# Patient Record
(demographics unavailable — no encounter records)

---

## 2024-10-14 NOTE — HISTORY OF PRESENT ILLNESS
[Irregular Periods] : irregular periods [Headaches] : no headaches [Polyuria] : no polyuria [Polydipsia] : no polydipsia [Constipation] : no constipation [Fatigue] : no fatigue [Abdominal Pain] : no abdominal pain [Vomiting] : no vomiting [FreeTextEntry2] : Nataliya is a now 13 year 10 month old female presenting for a follow-up visit of Hashimoto's thyroiditis.  I saw her 1/27/2021 due to abnormal TFT on 1/27/2021. She was referred for abnormal TFT obtained during regular physical. Her TSH returned at 40.1 uIU/mL low free T4 of 0.69, positive anti TPO antibody and positive Thyroglobulin Ab, normal CBC and normal CMP. U/S showed mildly heterogeneous thyroid gland with diffuse hyperemia. She also has a goiter all this is consistent with Hashimoto's thyroiditis. We reviewed that Hashimoto's can have waxing and waning course so first recommended repeat results in another couple of weeks. Repeat results 2/9/2021 showed 33.7 uIU/mL thus we recommended she is started on thyroid hormone replacement. Repeat results 4/9/021 showed normal TSH and T4. She has had regular follow-up, had reviewed once growth is almost over may trial off treatment.  I last saw her April 2024 for follow-up. They reported she is well. Got into [employer]. She admits that she is forgetting vitamin D a lot. She has however been consistent with thyroid medication. Repeat results before the visit normal recommended to continue the same dosage of levothyroxine and be more consistent with vitamin D.   Today states she is well, no concerns. Has school off today, school is going well. Thyroid medication is taken consistently. Mother voiced the MVI and vitamin D are on the counter top she just does not remember to take them.  [FreeTextEntry1] : About end of Nov 2023 had menarche, not quite regular, this month did not come yet.

## 2024-10-14 NOTE — PHYSICAL EXAM
[Healthy Appearing] : healthy appearing [Well Nourished] : well nourished [Interactive] : interactive [Normal Appearance] : normal appearance [Well formed] : well formed [Normally Set] : normally set [Normal S1 and S2] : normal S1 and S2 [Clear to Ausculation Bilaterally] : clear to auscultation bilaterally [Abdomen Soft] : soft [Abdomen Tenderness] : non-tender [] : no hepatosplenomegaly [Miguel Angel Stage ___] : the Miguel Angel stage for breast development was [unfilled] [Normal] : normal  [Murmur] : no murmurs

## 2024-10-14 NOTE — CONSULT LETTER
[Dear  ___] : Dear  [unfilled], [Courtesy Letter:] : I had the pleasure of seeing your patient, [unfilled], in my office today. [Please see my note below.] : Please see my note below. [Consult Closing:] : Thank you very much for allowing me to participate in the care of this patient.  If you have any questions, please do not hesitate to contact me. [Sincerely,] : Sincerely, [FreeTextEntry3] : YeouChing Hsu, MD \par  Division of Pediatric Endocrinology \par  [facility] \par   of Pediatrics \par  [facility] [employer] [facility] [employer] [facility]\par

## 2025-04-25 NOTE — HISTORY OF PRESENT ILLNESS
[Mother] : mother [Needs Immunizations] : Needs immunizations [Irregular menses] : irregular menses [Eats meals with family] : eats meals with family [Has family members/adults to turn to for help] : has family members/adults to turn to for help [Is permitted and is able to make independent decisions] : Is permitted and is able to make independent decisions [Normal Performance] : normal performance [Normal Behavior/Attention] : normal behavior/attention [Normal Homework] : normal homework [Eats regular meals including adequate fruits and vegetables] : eats regular meals including adequate fruits and vegetables [Drinks non-sweetened liquids] : drinks non-sweetened liquids  [Calcium source] : calcium source [Has friends] : has friends [Uses safety belts/safety equipment] : uses safety belts/safety equipment  [No] : Patient has not had sexual intercourse [HIV Screening Declined] : HIV Screening Declined [Has ways to cope with stress] : has ways to cope with stress [Displays self-confidence] : displays self-confidence [Has problems with sleep] : has problems with sleep [Gets depressed, anxious, or irritable/has mood swings] : gets depressed, anxious, or irritable/has mood swings [LMP: _____] : LMP: [unfilled] [Days of Bleeding: _____] : Days of bleeding: [unfilled] [Age of Menarche: ____] : Age of Menarche: [unfilled] [Grade: ____] : Grade: [unfilled] [Heavy Bleeding] : no heavy bleeding [Painful Cramps] : no painful cramps [Sleep Concerns] : no sleep concerns [Has concerns about body or appearance] : does not have concerns about body or appearance [Has interests/participates in community activities/volunteers] : does not have interests/participates in community activities/volunteers [Uses electronic nicotine delivery system] : does not use electronic nicotine delivery system [Exposure to electronic nicotine delivery system] : no exposure to electronic nicotine delivery system [Uses tobacco] : does not use tobacco [Exposure to tobacco] : no exposure to tobacco [Uses drugs] : does not use drugs  [Exposure to drugs] : no exposure to drugs [Drinks alcohol] : does not drink alcohol [Exposure to alcohol] : no exposure to alcohol [Has thought about hurting self or considered suicide] : has not thought about hurting self or considered suicide [FreeTextEntry7] : 13 y/o F here for initial APE.  [de-identified] : + chronic constipation; + flat feet [de-identified] : Flu vaccine [FreeTextEntry8] : pt hasn't logged her period. [de-identified] : Pt denies any current SI/HI thoughts [de-identified] : Advised joining clubs - badminton clubs [FreeTextEntry1] : Pt has chronic constipation, having hard stool once every 3 days.  Pt received Flu vaccine at [facility] in winter time.

## 2025-04-25 NOTE — PHYSICAL EXAM

## 2025-04-25 NOTE — HISTORY OF PRESENT ILLNESS
[Mother] : mother [Needs Immunizations] : Needs immunizations [Irregular menses] : irregular menses [Eats meals with family] : eats meals with family [Has family members/adults to turn to for help] : has family members/adults to turn to for help [Is permitted and is able to make independent decisions] : Is permitted and is able to make independent decisions [Normal Performance] : normal performance [Normal Behavior/Attention] : normal behavior/attention [Normal Homework] : normal homework [Eats regular meals including adequate fruits and vegetables] : eats regular meals including adequate fruits and vegetables [Drinks non-sweetened liquids] : drinks non-sweetened liquids  [Calcium source] : calcium source [Has friends] : has friends [Uses safety belts/safety equipment] : uses safety belts/safety equipment  [No] : Patient has not had sexual intercourse [HIV Screening Declined] : HIV Screening Declined [Has ways to cope with stress] : has ways to cope with stress [Displays self-confidence] : displays self-confidence [Has problems with sleep] : has problems with sleep [Gets depressed, anxious, or irritable/has mood swings] : gets depressed, anxious, or irritable/has mood swings [LMP: _____] : LMP: [unfilled] [Days of Bleeding: _____] : Days of bleeding: [unfilled] [Age of Menarche: ____] : Age of Menarche: [unfilled] [Grade: ____] : Grade: [unfilled] [Heavy Bleeding] : no heavy bleeding [Painful Cramps] : no painful cramps [Sleep Concerns] : no sleep concerns [Has concerns about body or appearance] : does not have concerns about body or appearance [Has interests/participates in community activities/volunteers] : does not have interests/participates in community activities/volunteers [Uses electronic nicotine delivery system] : does not use electronic nicotine delivery system [Exposure to electronic nicotine delivery system] : no exposure to electronic nicotine delivery system [Uses tobacco] : does not use tobacco [Exposure to tobacco] : no exposure to tobacco [Uses drugs] : does not use drugs  [Exposure to drugs] : no exposure to drugs [Drinks alcohol] : does not drink alcohol [Exposure to alcohol] : no exposure to alcohol [Has thought about hurting self or considered suicide] : has not thought about hurting self or considered suicide [FreeTextEntry7] : 13 y/o F here for initial APE.  [de-identified] : + chronic constipation; + flat feet [de-identified] : Flu vaccine [FreeTextEntry8] : pt hasn't logged her period. [de-identified] : Advised joining clubs - badminton clubs [de-identified] : Pt denies any current SI/HI thoughts [FreeTextEntry1] : Pt has chronic constipation, having hard stool once every 3 days.  Pt received Flu vaccine at [facility] in winter time.

## 2025-04-25 NOTE — DISCUSSION/SUMMARY
[FreeTextEntry1] : 14 year old female  Continue balanced diet with all food groups. Multivitamins advised. Brush teeth twice a day with toothbrush. Recommend visit to dentist. Maintain consistent daily routines and sleep schedule. Personal hygiene, puberty, and sexual health reviewed. Risky behaviors assessed. School discussed. Limit screen time to no more than 2 hours per day. Encourage physical activity. Return 1 year for routine well child check. Will obtain labs f/u Endo GI referral Podiatry referral f/u [facility]

## 2025-04-25 NOTE — HISTORY OF PRESENT ILLNESS
[Mother] : mother [Needs Immunizations] : Needs immunizations [Irregular menses] : irregular menses [Eats meals with family] : eats meals with family [Has family members/adults to turn to for help] : has family members/adults to turn to for help [Is permitted and is able to make independent decisions] : Is permitted and is able to make independent decisions [Normal Performance] : normal performance [Normal Behavior/Attention] : normal behavior/attention [Normal Homework] : normal homework [Eats regular meals including adequate fruits and vegetables] : eats regular meals including adequate fruits and vegetables [Drinks non-sweetened liquids] : drinks non-sweetened liquids  [Calcium source] : calcium source [Has friends] : has friends [Uses safety belts/safety equipment] : uses safety belts/safety equipment  [No] : Patient has not had sexual intercourse [HIV Screening Declined] : HIV Screening Declined [Has ways to cope with stress] : has ways to cope with stress [Displays self-confidence] : displays self-confidence [Has problems with sleep] : has problems with sleep [Gets depressed, anxious, or irritable/has mood swings] : gets depressed, anxious, or irritable/has mood swings [LMP: _____] : LMP: [unfilled] [Days of Bleeding: _____] : Days of bleeding: [unfilled] [Age of Menarche: ____] : Age of Menarche: [unfilled] [Grade: ____] : Grade: [unfilled] [Heavy Bleeding] : no heavy bleeding [Painful Cramps] : no painful cramps [Sleep Concerns] : no sleep concerns [Has concerns about body or appearance] : does not have concerns about body or appearance [Has interests/participates in community activities/volunteers] : does not have interests/participates in community activities/volunteers [Uses electronic nicotine delivery system] : does not use electronic nicotine delivery system [Exposure to electronic nicotine delivery system] : no exposure to electronic nicotine delivery system [Uses tobacco] : does not use tobacco [Exposure to tobacco] : no exposure to tobacco [Uses drugs] : does not use drugs  [Exposure to drugs] : no exposure to drugs [Drinks alcohol] : does not drink alcohol [Exposure to alcohol] : no exposure to alcohol [Has thought about hurting self or considered suicide] : has not thought about hurting self or considered suicide [FreeTextEntry7] : 15 y/o F here for initial APE.  [de-identified] : + chronic constipation; + flat feet [de-identified] : Flu vaccine [FreeTextEntry8] : pt hasn't logged her period. [de-identified] : Advised joining clubs - badminton clubs [de-identified] : Pt denies any current SI/HI thoughts [FreeTextEntry1] : Pt has chronic constipation, having hard stool once every 3 days.  Pt received Flu vaccine at [facility] in winter time.

## 2025-04-25 NOTE — CARE PLAN
[FreeTextEntry2] : Care plan for Hashimoto's thyroiditis - symptoms free [FreeTextEntry3] : - continue taking Levothyroxine 50mcg daily - f/u Endo - Will obtain labs

## 2025-04-25 NOTE — PHYSICAL EXAM

## 2025-04-25 NOTE — PHYSICAL EXAM

## 2025-05-19 NOTE — CONSULT LETTER
[Dear  ___] : Dear  [unfilled], [Courtesy Letter:] : I had the pleasure of seeing your patient, [unfilled], in my office today. [Please see my note below.] : Please see my note below. [Consult Closing:] : Thank you very much for allowing me to participate in the care of this patient.  If you have any questions, please do not hesitate to contact me. [Sincerely,] : Sincerely, [FreeTextEntry3] : YeouChing Hsu, MD \par  Division of Pediatric Endocrinology \par  [facility] \par   of Pediatrics \par  [facility] [employer] at [facility]\par

## 2025-05-19 NOTE — CONSULT LETTER
[Dear  ___] : Dear  [unfilled], [Courtesy Letter:] : I had the pleasure of seeing your patient, [unfilled], in my office today. [Please see my note below.] : Please see my note below. [Consult Closing:] : Thank you very much for allowing me to participate in the care of this patient.  If you have any questions, please do not hesitate to contact me. [Sincerely,] : Sincerely, [FreeTextEntry3] : YeouChing Hsu, MD \par  Division of Pediatric Endocrinology \par  [facility] \par   of Pediatrics \par  [facility] [employer] at [facility]\par   Is This A New Presentation, Or A Follow-Up?: Moles How Severe Is Your Skin Lesion?: moderate Has Your Skin Lesion Been Treated?: not been treated Which Family Member (Optional)?: Parents and siblings

## 2025-05-19 NOTE — HISTORY OF PRESENT ILLNESS
[Irregular Periods] : irregular periods [Constipation] : constipation [Headaches] : no headaches [Polyuria] : no polyuria [Polydipsia] : no polydipsia [Fatigue] : no fatigue [Abdominal Pain] : no abdominal pain [Vomiting] : no vomiting [FreeTextEntry2] : Nataliya is a now 86-lgkm-7-month-old female presenting for a follow-up visit of Hashimoto's thyroiditis.  I saw her 1/27/2021 due to abnormal TFT on 1/27/2021. She was referred for abnormal TFT obtained during regular physical. Her TSH returned at 40.1 uIU/mL low free T4 of 0.69, positive anti TPO antibody and positive Thyroglobulin Ab, normal CBC and normal CMP. U/S showed mildly heterogeneous thyroid gland with diffuse hyperemia. She also has a goiter all this is consistent with Hashimoto's thyroiditis. Repeat results 2/9/21 still showed elevated TSH to 33.7 uIU/mL thus we recommended she is started on thyroid hormone replacement. Repeat results 4/9/021 showed normal TSH and T4. She has had regular follow-up, had reviewed once growth is almost over may trial off treatment. She has had low vitamin D level but not consistent with vitamin D supplementation. I last saw her 10/14/2025 for follow-up when she was well. Repeat TFT normal, and vitamin D lower on the insufficient range recommended to be consistently taking 2,000 Iu daily of vitamin D along with consistently taking levothyroxine.   Today, they voiced that she is well. She had to reschedule because train delay. She is at [employer] school work is just busy and stressful. Looking into either internship or .  Consistently taking levothyroxine and vitamin D. She does still have on going issues with constipation. They treat with Miralax. She takes it every now and then and it helps it. Every 2-3 days she has bowel movement.  She did see pediatrician April 23rd. They had tried ex-lax and makes her want to go in school so not feeling great so does not want to take it.  She had blood work done with PCP, and Nataliya admits it was after that she was consistently taking hre vitamin D.  [FreeTextEntry1] : About end of Nov 2023 had menarche, not quite regular, this month did not come yet.

## 2025-05-19 NOTE — HISTORY OF PRESENT ILLNESS
[Irregular Periods] : irregular periods [Constipation] : constipation [Headaches] : no headaches [Polyuria] : no polyuria [Polydipsia] : no polydipsia [Fatigue] : no fatigue [Abdominal Pain] : no abdominal pain [Vomiting] : no vomiting [FreeTextEntry2] : Nataliya is a now 62-[id number]-9-month-old female presenting for a follow-up visit of Hashimoto's thyroiditis.  I saw her 1/27/2021 due to abnormal TFT on 1/27/2021. She was referred for abnormal TFT obtained during regular physical. Her TSH returned at 40.1 uIU/mL low free T4 of 0.69, positive anti TPO antibody and positive Thyroglobulin Ab, normal CBC and normal CMP. U/S showed mildly heterogeneous thyroid gland with diffuse hyperemia. She also has a goiter all this is consistent with Hashimoto's thyroiditis. Repeat results 2/9/21 still showed elevated TSH to 33.7 uIU/mL thus we recommended she is started on thyroid hormone replacement. Repeat results 4/9/021 showed normal TSH and T4. She has had regular follow-up, had reviewed once growth is almost over may trial off treatment. She has had low vitamin D level but not consistent with vitamin D supplementation. I last saw her 10/14/2025 for follow-up when she was well. Repeat TFT normal, and vitamin D lower on the insufficient range recommended to be consistently taking 2,000 Iu daily of vitamin D along with consistently taking levothyroxine.   Today, they voiced that she is well. She had to reschedule because train delay. She is at [employer] school work is just busy and stressful. Looking into either internship or .  Consistently taking levothyroxine and vitamin D. She does still have on going issues with constipation. They treat with Miralax. She takes it every now and then and it helps it. Every 2-3 days she has bowel movement.  She did see pediatrician April 23rd. They had tried ex-lax and makes her want to go in school so not feeling great so does not want to take it.  She had blood work done with PCP, and Nataliya admits it was after that she was consistently taking hre vitamin D.  [FreeTextEntry1] : About end of Nov 2023 had menarche, not quite regular, this month did not come yet.

## 2025-05-19 NOTE — CONSULT LETTER
[Dear  ___] : Dear  [unfilled], [Courtesy Letter:] : I had the pleasure of seeing your patient, [unfilled], in my office today. [Please see my note below.] : Please see my note below. [Consult Closing:] : Thank you very much for allowing me to participate in the care of this patient.  If you have any questions, please do not hesitate to contact me. [Sincerely,] : Sincerely, [FreeTextEntry3] : YeouChing Hsu, MD \par  Division of Pediatric Endocrinology \par  [facility] \par   of Pediatrics \par  [facility] [employer] at [facility]\par

## 2025-05-19 NOTE — HISTORY OF PRESENT ILLNESS
[Irregular Periods] : irregular periods [Constipation] : constipation [Headaches] : no headaches [Polyuria] : no polyuria [Polydipsia] : no polydipsia [Fatigue] : no fatigue [Abdominal Pain] : no abdominal pain [Vomiting] : no vomiting [FreeTextEntry2] : Nataliya is a now 02-wcmm-9-month-old female presenting for a follow-up visit of Hashimoto's thyroiditis.  I saw her 1/27/2021 due to abnormal TFT on 1/27/2021. She was referred for abnormal TFT obtained during regular physical. Her TSH returned at 40.1 uIU/mL low free T4 of 0.69, positive anti TPO antibody and positive Thyroglobulin Ab, normal CBC and normal CMP. U/S showed mildly heterogeneous thyroid gland with diffuse hyperemia. She also has a goiter all this is consistent with Hashimoto's thyroiditis. Repeat results 2/9/21 still showed elevated TSH to 33.7 uIU/mL thus we recommended she is started on thyroid hormone replacement. Repeat results 4/9/021 showed normal TSH and T4. She has had regular follow-up, had reviewed once growth is almost over may trial off treatment. She has had low vitamin D level but not consistent with vitamin D supplementation. I last saw her 10/14/2025 for follow-up when she was well. Repeat TFT normal, and vitamin D lower on the insufficient range recommended to be consistently taking 2,000 Iu daily of vitamin D along with consistently taking levothyroxine.   Today, they voiced that she is well. She had to reschedule because train delay. She is at [employer] school work is just busy and stressful. Looking into either internship or .  Consistently taking levothyroxine and vitamin D. She does still have on going issues with constipation. They treat with Miralax. She takes it every now and then and it helps it. Every 2-3 days she has bowel movement.  She did see pediatrician April 23rd. They had tried ex-lax and makes her want to go in school so not feeling great so does not want to take it.  She had blood work done with PCP, and Nataliya admits it was after that she was consistently taking hre vitamin D.  [FreeTextEntry1] : About end of Nov 2023 had menarche, not quite regular, this month did not come yet.

## 2025-05-19 NOTE — PHYSICAL EXAM
SUBJECTIVE:                                                      Olimpia Lamb is a 15 year old female, here for a routine health maintenance visit.    Patient was roomed by: Sury Caban Child     Social History  Forms to complete? YES  Child lives with::  Mother, father and sister  Recent family changes/ special stressors?:  None noted    Safety / Health Risk    TB Exposure:     YES, immigrant from country with endemic tuberculosis     Child always wear seatbelt?  Yes  Helmet worn for bicycle/roller blades/skateboard?  Yes    Home Safety Survey:      Firearms in the home?: No      Daily Activities    Dental     Dental provider: patient has a dental home    Risks: child has or had a cavity      Water source:  City water and filtered water    Sports physical needed: No        Media    TV in child's room: No    Types of media used: iPad, computer, video/dvd/tv, computer/ video games and social media    Daily use of media (hours): 5    School    Name of school: [employer]    Grade level: 10th    School performance: above grade level    Grades: As    Schooling concerns? no    Days missed current/ last year: 2    Academic problems: no problems in reading, no problems in mathematics, no problems in writing and no learning disabilities     Activities    Minimum of 60 minutes per day of physical activity: Yes    Activities: age appropriate activities, rides bike (helmet advised), scooter/ skateboard/ rollerblades (helmet advised) and music    Diet     Child gets at least 4 servings fruit or vegetables daily: NO    Servings of juice, non-diet soda, punch or sports drinks per day: 1    Sleep       Sleep concerns: no concerns- sleeps well through night     Bedtime: 22:00     Sleep duration (hours): 9      Sports physical needed: Yes         GENERAL QUESTIONS  1. Has a doctor ever denied or restricted your participation in sports for any reason or told you to give up sports?: No    2. Do you have an  ongoing medical condition (like diabetes,asthma, anemia, infections)?: No  3. Are you currently taking any prescription or nonprescription (over-the-counter) medicines or pills?: No    4. Do you have allergies to medicines, pollens, foods or stinging insects?: Yes, pollen     5. Have you ever spent the night in a hospital?: Yes    6. Have you ever had surgery?: Yes      HEART HEALTH QUESTIONS ABOUT YOU  7. Have you ever passed out or nearly passed out DURING exercise?: No  8. Have you ever passed out or nearly passed out AFTER exercise?: No    9. Have you ever had discomfort, pain, tightness, or pressure in your chest during exercise?: No    10. Does your heart race or skip beats (irregular beats) during exercise?: No    11. Has a doctor ever told you that you have any of the following: high blood pressure, a heart murmur, high cholesterol, a heart infection, Rheumatic fever, Kawasaki's Disease?: No    12. Has a doctor ever ordered a test for your heart? (for example: ECG/EKG, echocardiogram, stress test): No    13. Do you ever get lightheaded or feel more short of breath than expected during exercise?: No    14. Have you ever had an unexplained seizure?: No    15. Do you get more tired or short of breath more quickly than your friends during exercise?: No      BONE AND JOINT QUESTIONS  20. Have you ever had an injury, like a sprain, muscle or ligament tear or tendonitis, that caused you to miss a practice or game?: No    21. Have you had any broken or fractured bones, or dislocated joints?: No    22. Have you had a an injury that required x-rays, MRI, CT, surgery, injections, therapy, a brace, a cast, or crutches?: No    23. Have you ever had a stress fracture?: No    24. Have you ever been told that you have or have you had an x-ray for neck instability or atlantoaxial instability? (Down syndrome or dwarfism): No    25. Do you regularly use a brace, orthotics or assistive device?: No    26. Do you have a  bone,muscle, or joint injury that bothers you?: No    27. Do any of your joints become painful, swollen, feel warm or look red?: No    28. Do you have any history of juvenile arthritis or connective tissue disease?: No      MEDICAL QUESTIONS  29. Has a doctor ever told you that you have asthma or allergies?: No    30. Do you cough, wheeze, have chest tightness, or have difficulty breathing during or after exercise?: No    31. Is there anyone in your family who has asthma?: No    32. Have you ever used an inhaler or taken asthma medicine?: No    33. Do you develop a rash or hives when you exercise?: No    34. Were you born without or are you missing a kidney, an eye, a testicle (males), or any other organ?: No    35. Do you have groin pain or a painful bulge or hernia in the groin area?: No    36. Have you had infectious mononucleosis (mono) within the last month?: No    37. Do you have any rashes, pressure sores, or other skin problems?: No    38. Have you had a herpes or MRSA skin infection?: No    39. Have you had a head injury or concussion?: No    40. Have you ever had a hit or blow in the head that caused confusion, prolonged headaches, or memory problems?: No    41. Do you have a history of seizure disorder?: No    42. Do you have headaches with exercise?: No    43. Have you ever had numbness, tingling or weakness in your arms or legs after being hit or falling?: No    44. Have you ever been unable to move your arms or legs after being hit or falling?: No    45. Have you ever become ill while exercising in the heat?: No    46. Do you get frequent muscle cramps when exercising?: No    48. Have you had any problems with your eyes or vision?: No    49. Have you had any eye injuries?: No    50. Do you wear glasses or contact lenses?: No    51. Do you wear protective eyewear, such as goggles or a face shield?: No    52. Do you worry about your weight?: No    53. Are you trying to or has anyone recommended that you  gain or lose weight?: No    54. Are you on a special diet or do you avoid certain types of foods?: No    55. Have you ever had an eating disorder?: No      FEMALES ONLY  57. Have you ever had a menstrual period?: Yes    58. How old were you when you had your first menstrual period?:  11  59. How many menstrual periods have you had in the last year?:    12        VISION   Wears glasses: NOT worn for testing  Tool used: Espino  Right eye: 10/10 (20/20)  Left eye: 10/12.5 (20/25)  Two Line Difference: No  Visual Acuity: Pass  H Plus Lens Screening: Pass    Vision Assessment: normal      HEARING  Right Ear:      500 Hz: RESPONSE- on Level: 25 db   1000 Hz: RESPONSE- on Level:   20 db    2000 Hz: RESPONSE- on Level:   20 db    4000 Hz: RESPONSE- on Level:   20 db    6000 Hz: RESPONSE- on Level:   20 db     Left Ear:      6000 Hz: RESPONSE- on Level:   20 db    4000 Hz: RESPONSE- on Level:   20 db    2000 Hz: RESPONSE- on Level:   20 db    1000 Hz: RESPONSE- on Level:   20 db      500 Hz: RESPONSE- on Level: 25 db    Hearing Acuity: Pass    Hearing Assessment: normal    QUESTIONS/CONCERNS: None    MENSTRUAL HISTORY  Normal      ============================================================    PSYCHO-SOCIAL/DEPRESSION  General screening:    Electronic PSC   PSC SCORES 6/20/2018   Inattentive / Hyperactive Symptoms Subtotal 2   Externalizing Symptoms Subtotal 5   Internalizing Symptoms Subtotal 1   PSC - 17 Total Score 8      no followup necessary  No concerns    PROBLEM LIST  Patient Active Problem List   Diagnosis     Routine infant or child health check     MEDICATIONS  Current Outpatient Prescriptions   Medication Sig Dispense Refill     Acetaminophen (TYLENOL PO)         ALLERGY  No Known Allergies    IMMUNIZATIONS  Immunization History   Administered Date(s) Administered     DTAP (<7y) 07/11/2003, 08/11/2003, 09/15/2003, 12/07/2007     HEPA 11/22/2005, 06/14/2006, 12/07/2007     HPV 07/11/2016     HPV9 05/23/2017      HepB 02/27/2003, 03/31/2003, 09/22/2003     MMR 12/07/2007, 03/20/2015     Meningococcal (Menactra ) 03/20/2015     Poliovirus, inactivated (IPV) 07/20/2004, 12/07/2007     TDAP Vaccine (Adacel) 03/20/2015     Varicella 12/07/2007, 03/20/2015       HEALTH HISTORY SINCE LAST VISIT  No surgery, major illness or injury since last physical exam    DRUGS  Smoking:  no  Passive smoke exposure:  no  Alcohol:  no  Drugs:  no    SEXUALITY  Sexual activity: No    ROS  GENERAL: See health history, nutrition and daily activities   SKIN: No  rash, hives or significant lesions  HEENT: Hearing/vision: see above.  No eye, nasal, ear symptoms.  RESP: No cough or other concerns  CV: No concerns  GI: See nutrition and elimination.  No concerns.  : See elimination. No concerns  NEURO: No headaches or concerns.    OBJECTIVE:   EXAM  There were no vitals taken for this visit.  No height on file for this encounter.  No weight on file for this encounter.  No height and weight on file for this encounter.  No blood pressure reading on file for this encounter.  GENERAL: Active, alert, in no acute distress.  SKIN: Clear. No significant rash, abnormal pigmentation or lesions  HEAD: Normocephalic  EYES: Pupils equal, round, reactive, Extraocular muscles intact. Normal conjunctivae.  EARS: Normal canals. Tympanic membranes are normal; gray and translucent.  NOSE: Normal without discharge.  MOUTH/THROAT: Clear. No oral lesions. Teeth without obvious abnormalities.  NECK: Supple, no masses.  No thyromegaly.  LYMPH NODES: No adenopathy  LUNGS: Clear. No rales, rhonchi, wheezing or retractions  HEART: Regular rhythm. Normal S1/S2. No murmurs. Normal pulses.  ABDOMEN: Soft, non-tender, not distended, no masses or hepatosplenomegaly. Bowel sounds normal.   NEUROLOGIC: No focal findings. Cranial nerves grossly intact: DTR's normal. Normal gait, strength and tone  BACK: Spine is straight, no scoliosis.  EXTREMITIES: Full range of motion, no  deformities  -F: Normal female external genitalia,   SPORTS EXAM:    No Marfan stigmata: kyphoscoliosis, high-arched palate, pectus excavatuM, arachnodactyly, arm span > height, hyperlaxity, myopia, MVP, aortic insufficieny)  Eyes: normal fundoscopic and pupils  Cardiovascular: normal PMI, simultaneous femoral/radial pulses, no murmurs (standing, supine, Valsalva)  Skin: no HSV, MRSA, tinea corporis  Musculoskeletal    Neck: normal    Back: normal    Shoulder/arm: normal    Elbow/forearm: normal    Wrist/hand/fingers: normal    Hip/thigh: normal    Knee: normal    Leg/ankle: normal    Foot/toes: normal    Functional (Single Leg Hop or Squat): normal    ASSESSMENT/PLAN:     1. Encounter for routine child health examination w/o abnormal findings  - PURE TONE HEARING TEST, AIR  - SCREENING, VISUAL ACUITY, QUANTITATIVE, BILAT  - BEHAVIORAL / EMOTIONAL ASSESSMENT [[id number]]    Anticipatory Guidance  Reviewed Anticipatory Guidance in patient instructions    Preventive Care Plan  Immunizations    Reviewed, up to date  Referrals/Ongoing Specialty care: No   See other orders in [facility].  Cleared for sports:  Yes  BMI at No height and weight on file for this encounter.  No weight concerns.  Dyslipidemia risk:    None  Dental visit recommended: Yes      FOLLOW-UP:    in 1 year for a Preventive Care visit    Resources  HPV and Cancer Prevention:  What Parents Should Know  What Kids Should Know About HPV and Cancer  Goal Tracker: Be More Active  Goal Tracker: Less Screen Time  Goal Tracker: Drink More Water  Goal Tracker: Eat More Fruits and Veggies    Jennifer Fried MD  [facility]   [Healthy Appearing] : healthy appearing [Well Nourished] : well nourished [Interactive] : interactive [Normal Appearance] : normal appearance [Well formed] : well formed [Normally Set] : normally set [Normal S1 and S2] : normal S1 and S2 [Clear to Ausculation Bilaterally] : clear to auscultation bilaterally [Abdomen Soft] : soft [Abdomen Tenderness] : non-tender [] : no hepatosplenomegaly [Miguel Angel Stage ___] : the Miguel Angel stage for breast development was [unfilled] [Normal] : normal  [Murmur] : no murmurs